# Patient Record
Sex: FEMALE | Race: WHITE
[De-identification: names, ages, dates, MRNs, and addresses within clinical notes are randomized per-mention and may not be internally consistent; named-entity substitution may affect disease eponyms.]

---

## 2018-06-28 ENCOUNTER — HOSPITAL ENCOUNTER (EMERGENCY)
Dept: HOSPITAL 74 - FER | Age: 83
Discharge: HOME | End: 2018-06-28
Payer: COMMERCIAL

## 2018-06-28 VITALS — DIASTOLIC BLOOD PRESSURE: 78 MMHG | SYSTOLIC BLOOD PRESSURE: 154 MMHG | TEMPERATURE: 97.6 F | HEART RATE: 78 BPM

## 2018-06-28 VITALS — BODY MASS INDEX: 19.9 KG/M2
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
## 2018-06-28 DIAGNOSIS — F03.90: ICD-10-CM

## 2018-06-28 DIAGNOSIS — E11.9: ICD-10-CM

## 2018-06-28 DIAGNOSIS — Z91.81: ICD-10-CM
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
## 2018-06-28 DIAGNOSIS — S00.03XA: Primary | ICD-10-CM

## 2023-09-07 NOTE — PDOC
History of Present Illness





- General


History Source: Patient


Exam Limitations: No Limitations





- History of Present Illness


Initial Comments: 





06/28/18 20:29


The patient is a 83 year old female with a significant PMH of dementia and 

diabetes who presents to the emergency department s/p fall about 12 hours ago. 

The patient's family reports that the patient had a witnessed fall by her home 

aide by which she fell back and hit her head. The patients aide denies any loc. 

The patient reports associated left sided head pain secondary to fall. She 

denies any headache, dizziness  or vision change. She denies any numbness 

weakness or dizziness. The patient denies any shortness of breath or chest 

pain. She denies any fever, chills, nausea, vomit, diarrhea, constipation or 

urinary symptoms. The patient denies any other complaints. 


 


PAST MEDICAL HISTORY:  dementia and diabetes





PAST SURGICAL HISTORY:  appendectomy





FAMILY HISTORY:  no pertinent history





SOCIAL HISTORY:  Pt lives at home





MEDICATIONS:  reviewed





ALLERGIES:  As per nursing notes








General:  No fevers or chills, no weakness, no weight loss 


HEENT: No change in vision.  No sore throat,. No ear pain


CardioVascular:  No chest pain or shortness of breath


Respiratory:No cough, or wheezing. 


Gastrointestinal:  no nausea, vomiting, diarrhea or constipation,  No rectal 

bleeding


Genitourinary:  No dysuria, hematuria, or frequency


Musculoskeletal:  No joint or muscle pain or swelling


Neurologic: (+)head pain s/p fall. No headache, vertigo, dizziness or loss of 

consciousness


Psychiatric: nor depression 


Skin: No rashes or easy bruising


Endocrine: no increased thirst or abnormal weight change


Allergic: no skin or latex allergy


All other systems reviewed and normal


 


GENERAL: The patient is awake, alert, and fully oriented, in no acute distress.


HEAD: (+) contusion to left occipital area of head. Skin intact .Normal with no 

signs of trauma. No cervical spine bony tenderness on palpation. 


EYES: Pupils equal, round and reactive to light, extraocular movements intact, 

sclera anicteric, conjunctiva clear.


EXTREMITIES: Normal range of motion, no edema.


NEUROLOGICAL: Normal speech, normal gait.


PSYCH: Normal mood, normal affect.


SKIN: Warm, Dry, normal turgor, no rashes or lesions noted.








<Yamil Khalil - Last Filed: 06/28/18 20:29>





- General


History Source: Patient, Family


Exam Limitations: Dementia





- History of Present Illness


Initial Comments: 








06/28/18 20:37


 


A portion of this note was documented by scribe services under my direction. I 

have reviewed the details of the note, within reason, and agree with the 

documentation.  The case summary and management plan written by me. 





Assessment and plan: This is an 83-year-old female brought in by her family for 

evaluation status post fall one day ago. Patient is on Plavix and aspirin. 

Patient has dementia.





A CAT scan was performed and it was negative for any acute intercranial 

pathology.





Patient is at her baseline mental status and nonfocal neurological exam.





Patient discharged home with family





<Niharika Cronin - Last Filed: 06/28/18 20:38>





- General


Chief Complaint: Injury


Stated Complaint: FELL STRUCK BACK OF HEAD


Time Seen by Provider: 06/28/18 19:18





Past History





<Yamil Khalil - Last Filed: 06/28/18 20:29>





<Niharika Cronin - Last Filed: 06/28/18 20:38>





- Past Medical History


Allergies/Adverse Reactions: 


 Allergies











Allergy/AdvReac Type Severity Reaction Status Date / Time


 


No Known Allergies Allergy   Verified 06/28/18 19:35











Home Medications: 


Ambulatory Orders





Aspirin Coated [Ecotrin -] 81 mg PO DAILY 06/28/18 


Atorvastatin Ca [Lipitor] 20 mg PO DAILY 06/28/18 


Cholecalciferol (Vitamin D3) [Vitamin D3] 1,000 unit PO DAILY 06/28/18 


Clopidogrel Bisulfate [Plavix] 75 mg PO DAILY 06/28/18 


Cyproheptadine [Periactin -] 4 mg PO DAILY 06/28/18 


Donepezil HCl [Aricept] 10 mg PO HS 06/28/18 


Gabapentin [Neurontin] 300 mg PO HS 06/28/18 


Insulin Lispro [Humalog] 8 unit SQ ACHS 06/28/18 


Linaclotide [Linzess] 145 mcg PO DAILY 06/28/18 


Lisinopril [Zestril] 2.5 mg PO DAILY 06/28/18 


Memantine HCl [Namenda -] 10 mg PO DAILY 06/28/18 


Trazodone HCl 50 mg PO HS 06/28/18 











*Physical Exam





- Vital Signs


 Last Vital Signs











Temp Pulse Resp BP Pulse Ox


 


 97.6 F   78   16   154/78   97 


 


 06/28/18 19:17  06/28/18 19:17  06/28/18 19:17  06/28/18 19:17  06/28/18 19:17














<Yamil Khalil - Last Filed: 06/28/18 20:29>





*DC/Admit/Observation/Transfer





- Attestations


Scribe Attestion: 





06/28/18 20:29





Documentation prepared by Yamil Khalil, acting as medical scribe for 

Niharika Cronin MD.





<Yamil Khalil - Last Filed: 06/28/18 20:29>





<Niharika Cronin - Last Filed: 06/28/18 20:38>


Diagnosis at time of Disposition: 


Hematoma of scalp


Qualifiers:


 Encounter type: initial encounter Qualified Code(s): S00.03XA - Contusion of 

scalp, initial encounter








- Discharge Dispostion


Disposition: HOME


Condition at time of disposition: Good





- Patient Instructions


Additional Instructions: 


Tylenol as needed for pain.





Return to the emergency department immediately with ANY new, persistent or 

worsening symptoms.





Continue any medications as previously prescribed by your physician.





You should follow up with your primary doctor as soon as possible regarding 

today's emergency department visit.


.


Please make sure your doctor reviews the results of your emergency evaluation.





Thank you for coming to the   Emergency Department today for your care. It was 

a pleasure to see you today. Please note that your evaluation is INCOMPLETE 

until you  follow-up with your doctor. Propranolol Counseling:  I discussed with the patient the risks of propranolol including but not limited to low heart rate, low blood pressure, low blood sugar, restlessness and increased cold sensitivity. They should call the office if they experience any of these side effects.